# Patient Record
Sex: FEMALE | Race: WHITE | NOT HISPANIC OR LATINO | Employment: FULL TIME | URBAN - METROPOLITAN AREA
[De-identification: names, ages, dates, MRNs, and addresses within clinical notes are randomized per-mention and may not be internally consistent; named-entity substitution may affect disease eponyms.]

---

## 2024-01-08 ENCOUNTER — OFFICE VISIT (OUTPATIENT)
Dept: SURGICAL ONCOLOGY | Facility: HOSPITAL | Age: 69
End: 2024-01-08
Payer: COMMERCIAL

## 2024-01-08 ENCOUNTER — HOSPITAL ENCOUNTER (OUTPATIENT)
Dept: RADIOLOGY | Facility: HOSPITAL | Age: 69
Discharge: HOME | End: 2024-01-08
Payer: COMMERCIAL

## 2024-01-08 VITALS
SYSTOLIC BLOOD PRESSURE: 135 MMHG | DIASTOLIC BLOOD PRESSURE: 78 MMHG | TEMPERATURE: 96.8 F | WEIGHT: 147.2 LBS | BODY MASS INDEX: 24.5 KG/M2 | HEART RATE: 61 BPM

## 2024-01-08 DIAGNOSIS — R92.30 DENSE BREAST TISSUE: ICD-10-CM

## 2024-01-08 DIAGNOSIS — D05.02 LOBULAR CARCINOMA IN SITU (LCIS) OF LEFT BREAST: ICD-10-CM

## 2024-01-08 DIAGNOSIS — Z12.31 SCREENING MAMMOGRAM FOR HIGH-RISK PATIENT: ICD-10-CM

## 2024-01-08 DIAGNOSIS — D05.00 LOBULAR CARCINOMA IN SITU OF UNSPECIFIED BREAST: ICD-10-CM

## 2024-01-08 DIAGNOSIS — Z12.39 BREAST CANCER SCREENING, HIGH RISK PATIENT: Primary | ICD-10-CM

## 2024-01-08 PROCEDURE — 99213 OFFICE O/P EST LOW 20 MIN: CPT | Mod: 25 | Performed by: NURSE PRACTITIONER

## 2024-01-08 PROCEDURE — 77067 SCR MAMMO BI INCL CAD: CPT

## 2024-01-08 PROCEDURE — 77063 BREAST TOMOSYNTHESIS BI: CPT | Performed by: STUDENT IN AN ORGANIZED HEALTH CARE EDUCATION/TRAINING PROGRAM

## 2024-01-08 PROCEDURE — 77067 SCR MAMMO BI INCL CAD: CPT | Performed by: STUDENT IN AN ORGANIZED HEALTH CARE EDUCATION/TRAINING PROGRAM

## 2024-01-08 PROCEDURE — 1160F RVW MEDS BY RX/DR IN RCRD: CPT | Performed by: NURSE PRACTITIONER

## 2024-01-08 PROCEDURE — 1126F AMNT PAIN NOTED NONE PRSNT: CPT | Performed by: NURSE PRACTITIONER

## 2024-01-08 PROCEDURE — 99213 OFFICE O/P EST LOW 20 MIN: CPT | Performed by: NURSE PRACTITIONER

## 2024-01-08 PROCEDURE — 1159F MED LIST DOCD IN RCRD: CPT | Performed by: NURSE PRACTITIONER

## 2024-01-08 RX ORDER — FLUTICASONE FUROATE 27.5 UG/1
2 SPRAY, METERED NASAL
COMMUNITY

## 2024-01-08 RX ORDER — PHENTERMINE HYDROCHLORIDE 30 MG/1
30 CAPSULE ORAL
COMMUNITY

## 2024-01-08 ASSESSMENT — ENCOUNTER SYMPTOMS
LOSS OF SENSATION IN FEET: 0
DEPRESSION: 0
OCCASIONAL FEELINGS OF UNSTEADINESS: 0

## 2024-01-08 ASSESSMENT — PAIN SCALES - GENERAL: PAINLEVEL: 0-NO PAIN

## 2024-01-08 NOTE — PROGRESS NOTES
[facility] CANCER [place]  Lisa Covington female   1955 68 y.o.   [id number]      Chief Complaint  Follow up annual mammogram and exam.     History Of Present Illness  Lisa Covington is a very pleasant 68 y.o.  female followed in the breast center for high risk surveillance care. 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 She completed 5 years of Tamoxifen from 9587-3323. She has family history of breast cancer in her paternal grandmother. She presents today for her annual mammogram and exam.     BREAST IMAGIN2024 Bilateral screening mammogram, indicates BI-RADS Category 2.    REPRODUCTIVE HISTORY: menarche age 15, , first birth age 33, did not breastfeed, OCP's x 20 years, natural menopause age 55, no HRT, heterogeneously dense    FAMILY CANCER HISTORY:   Paternal Grandmother: Breast cancer, elderly when diagnosed     Review of Systems  Constitutional:  Negative for appetite change, fatigue, fever and unexpected weight change.   HENT:  Negative for ear pain, hearing loss, nosebleeds, sore throat and trouble swallowing.    Eyes:  Negative for discharge, itching and visual disturbance.   Breast: As stated in HPI.  Respiratory:  Negative for cough, chest tightness and shortness of breath.    Cardiovascular:  Negative for chest pain, palpitations and leg swelling.   Gastrointestinal:  Negative for abdominal pain, constipation, diarrhea and nausea.   Endocrine: Negative for cold intolerance and heat intolerance.   Genitourinary:  Negative for dysuria, frequency, hematuria, pelvic pain and vaginal bleeding.   Musculoskeletal:  Negative for arthralgias, back pain, gait problem, joint swelling and myalgias.   Skin:  Negative for color change and rash.   Allergic/Immunologic: Negative for environmental allergies and food allergies.   Neurological:  Negative for dizziness, tremors, speech difficulty, weakness, numbness  and headaches.   Hematological:  Does not bruise/bleed easily.   Psychiatric/Behavioral:  Negative for agitation, dysphoric mood and sleep disturbance. The patient is not nervous/anxious.      Past Medical History  She has a past medical history of Benign neoplasm of unspecified breast (07/14/2014), Breast cancer (CMS/[facility]), Encounter for gynecological examination (general) (routine) without abnormal findings (08/15/2014), Encounter for gynecological examination (general) (routine) without abnormal findings (01/27/2017), Encounter for gynecological examination (general) (routine) without abnormal findings (11/25/2015), Encounter for screening for osteoporosis (07/05/2016), Personal history of other diseases of the nervous system and sense organs (05/03/2014), Personal history of other specified conditions (09/30/2013), Personal history of urinary (tract) infections (04/28/2015), Postnasal drip (07/14/2014), Rash and other nonspecific skin eruption (07/05/2016), Unspecified benign mammary dysplasia of unspecified breast (12/12/2022), and Urinary tract infection, site not specified (01/05/2017).    Surgical History  She has a past surgical history that includes Dilation and curettage of uterus (08/15/2014); Tonsillectomy (08/15/2014); Breast biopsy (08/15/2014); and Sinus surgery (08/04/2015).    Family History  Cancer-related family history includes Breast cancer (age of onset: 60) in her paternal grandmother.     Social History  She reports that she has never smoked. She does not have any smokeless tobacco history on file. No history on file for alcohol use and drug use.    Allergies  Sulfa (sulfonamide antibiotics)    Medications  Current Outpatient Medications   Medication Instructions    fluticasone (Flonase Sensimist) 27.5 mcg/actuation nasal spray 2 sprays, Each Nostril, Daily RT    phentermine 30 mg, oral, Daily before breakfast       Last Recorded Vitals  Vitals:    01/08/24 0936   BP: 135/78   Pulse: 61    Temp: 36 °C (96.8 °F)        Physical Exam  Chest:       Patient is alert and oriented x3 and in a relaxed and appropriate mood. Her gait is steady and hand grasps are equal. Sclera is clear. The breasts are nearly symmetrical. Left breast has a well healed partial circum areolar incision. The tissue is soft without palpable abnormalities, discrete nodules or masses. The skin and nipples appear normal. There is no cervical, supraclavicular or axillary lymphadenopathy. Heart rate and rhythm normal, S1 and S2 appreciated. The lungs are clear to auscultation bilaterally. Abdomen is soft and non-tender.     Relevant Results and Imaging  BI mammo bilateral screening tomosynthesis 01/08/2024    Narrative  Interpreted By:  Emery Lorenzo,  STUDY:  BI MAMMO BILATERAL SCREENING TOMOSYNTHESIS;  1/8/2024 9:15 am    ACCESSION NUMBER(S):  [id number]    ORDERING CLINICIAN:  DONALD ELDER    INDICATION:  Screening. Patient has a family history of breast cancer in her  paternal grandmother at age 60. history of benign left excisional  biopsy demonstrating LCIS in 2007.    COMPARISON:  12/12/2022, 10/04/2021, 09/28/2020    FINDINGS:  2D and tomosynthesis images were reviewed at 1 mm slice thickness.    Density:  The breast tissue is heterogeneously dense, which may  obscure small masses.    Bilateral benign circumscribed masses. Biopsy marker in the upper  inner subareolar left breast. No suspicious masses or calcifications  are identified.    Impression  No mammographic evidence of malignancy.    BI-RADS CATEGORY:  BI-RADS Category:  2 Benign.  Recommendation:  Routine Screening Mammogram in 1 Year.  Recommended Date:  1 Year.  Laterality:  Bilateral.    For any future breast imaging appointments, please call [phone]  ([phone]).      MACRO:  None    Signed by: Emery Lorenzo 1/8/2024 9:23 AM  Dictation workstation:   [id number]    Time was spent viewing digital images. I explained the results in depth, along with suggested  explanation for follow up recommendations based on the testing results. BI-RADS Category 2    Orders  Orders Placed This Encounter   Procedures    BI mammo bilateral screening tomosynthesis     Standing Status:   Future     Standing Expiration Date:   3/8/2025     Order Specific Question:   Reason for exam:     Answer:   screening mammogram     Order Specific Question:   Radiologist to Determine Optimal Study     Answer:   Yes     Order Specific Question:   Release result to [employer]     Answer:   Immediate     Order Specific Question:   Is this exam part of a Research Study? If Yes, link this order to the research study     Answer:   No    MR breast bilateral w IV contrast fast screening self pay     Standing Status:   Future     Standing Expiration Date:   1/8/2025     Order Specific Question:   Reason for exam:     Answer:   high risk surveillance care, lifetime risk > 20%     Order Specific Question:   Does the patient have a Cochlear Implant, Pacemaker, Defibrillator, Pacing Wire, Brain Aneurysm Clip, Implanted Nerve or Bone Graft Simulator, Implanted Breast Tissue Expander, Glucose Monitor or Neulasta Device?     Answer:   No     Order Specific Question:   Radiologist to Determine Optimal Study     Answer:   Yes     Order Specific Question:   Release result to [employer]     Answer:   Immediate     Order Specific Question:   Is this exam part of a Research Study? If Yes, link this order to the research study     Answer:   No       Visit Diagnosis  1. Breast cancer screening, high risk patient  BI mammo bilateral screening tomosynthesis    Clinic Appointment Request Follow Up    MR breast bilateral w IV contrast fast screening self pay      2. Lobular carcinoma in situ (LCIS) of left breast        3. Dense breast tissue            Assessment/Plan  High risk surveillance care, normal clinical exam and imaging, history left breast excisional biopsy for LCIS, completed 5 years of Tamoxifen, no family history of breast  cancer, heterogeneously dense    Plan:  Return in January 2025 for bilateral screening mammogram and office visit. Proceed with fast breast MRI July 2024.     Patient Discussion/Summary  Your clinical examination and imaging are normal. Please return in one year for mammogram and office visit or sooner if you have any problems or concerns.     High risk breast surveillance care plan:  Yearly mammogram with digital breast tomosynthesis  Twice yearly clinical breast examinations  Breast MRI (to schedule call [phone])  Monthly self breast examinations &/or regular self breast awareness  Vitamin D3 2000 IU/daily (over the counter) unless your PCP recommends you take a specific dose  Exercise 3-4 times per week for 45-60 minutes  Limit alcohol to 3-4 drinks per week if you are menopausal  Eat healthy low-fat diet with lots of vegetable & fruits  Risk models indicate personal risk of breast cancer in the next 5 years and  lifetime (age 85-90):  Olga: 5-year risk 15.4% (average 1.6%), lifetime risk 37.2%, (average 4.6%)  You completed 5 years of Tamoxifen therapy decreasing your risk of breast cancer up to 50%.    You can see your health information, review clinical summaries from office visits & test results online when you follow your health with MY  Chart, a personal health record. To sign up go to [email or web address]. If you need assistance with signing up or trouble getting into your account call [employer] Patient Line 24/7 at [phone].    My office phone number is [phone] if you need to get in touch with me or have additional questions or concerns. Thank you for choosing [facility] and trusting me as your healthcare provider. I look forward to seeing you again at your next office visit. I am honored to be a provider on your health care team and I remain dedicated to helping you achieve your health goals.    Asha Renee, SONIA-CNP

## 2024-05-29 ENCOUNTER — HOSPITAL ENCOUNTER (OUTPATIENT)
Dept: RADIOLOGY | Facility: HOSPITAL | Age: 69
Discharge: HOME | End: 2024-05-29

## 2024-05-29 ENCOUNTER — TELEPHONE (OUTPATIENT)
Dept: SURGICAL ONCOLOGY | Facility: CLINIC | Age: 69
End: 2024-05-29
Payer: COMMERCIAL

## 2024-05-29 DIAGNOSIS — Z12.39 BREAST CANCER SCREENING, HIGH RISK PATIENT: ICD-10-CM

## 2024-05-29 PROCEDURE — 2550000001 HC RX 255 CONTRASTS: Performed by: NURSE PRACTITIONER

## 2024-05-29 PROCEDURE — A9575 INJ GADOTERATE MEGLUMI 0.1ML: HCPCS | Performed by: NURSE PRACTITIONER

## 2024-05-29 PROCEDURE — 6100000003 BI MR BREAST BILATERAL WITH CONTRAST FAST SCREENING SELF PAY

## 2024-05-29 RX ORDER — GADOTERATE MEGLUMINE 376.9 MG/ML
13 INJECTION INTRAVENOUS
Status: COMPLETED | OUTPATIENT
Start: 2024-05-29 | End: 2024-05-29

## 2024-05-29 RX ADMIN — GADOTERATE MEGLUMINE 13 ML: 376.9 INJECTION INTRAVENOUS at 07:14

## 2024-05-29 NOTE — TELEPHONE ENCOUNTER
Result Communication    Spoke Lisa Covington regarding normal MRI results.      Resulted Orders   MR breast bilateral w IV contrast fast screening self pay    Narrative    Interpreted By:  Andrey Hernandez,   STUDY:  BI MR BREAST BILATERAL WITH CONTRAST FAST SCREENING SELF PAY;  5/29/2024 7:19 am      ACCESSION NUMBER(S):  [id number]      ORDERING CLINICIAN:  DONALD ELDER      INDICATION:  High-risk screening. Family history of breast cancer prior, left  excisional biopsy for LCIS, dense breast tissue on mammography.      COMPARISON:  Breast MRI 06/05/2023, 05/16/2022; screening mammogram 01/08/2024.      TECHNIQUE:  Using a dedicated breast coil, STIR axial and T1-weighted fat  saturation axial images of the breasts were obtained, the latter both  before and after intravenous administration of Gadolinium DTPA.      Intravenous contrast: 13 mL of Dotarem      FINDINGS:  Density: Heterogeneous fibroglandular tissue.      There is symmetric marked bilateral background enhancement.      RIGHT BREAST: A few small scattered benign cysts. No suspicious mass  or nonmass enhancement is identified.      No axillary or internal mammary lymphadenopathy is appreciated.      LEFT BREAST: A few small scattered benign cysts. A biopsy tissue  marker is noted in subareolar region. No suspicious mass or nonmass  enhancement is identified.      No axillary or internal mammary lymphadenopathy is appreciated.      NON-BREAST FINDINGS:  None.        Impression    No MRI evidence of malignancy in either breast.  The patient is due  for annual screening mammography in January 2025.      BI-RADS CATEGORY:      BI-RADS Category:  2 Benign.  Recommendation:  Annual Screening.  Recommended Date:  1 Year.  Laterality:  Bilateral.      For any future breast imaging appointments, please call [phone] ([phone]).          MACRO:  None      Signed by: Andrey Hernandez 5/29/2024 11:37 AM  Dictation workstation:   [id number]       12:12 PM

## 2025-02-03 ENCOUNTER — APPOINTMENT (OUTPATIENT)
Dept: RADIOLOGY | Facility: HOSPITAL | Age: 70
End: 2025-02-03
Payer: COMMERCIAL

## 2025-02-03 ENCOUNTER — HOSPITAL ENCOUNTER (OUTPATIENT)
Dept: RADIOLOGY | Facility: HOSPITAL | Age: 70
Discharge: HOME | End: 2025-02-03
Payer: COMMERCIAL

## 2025-02-03 ENCOUNTER — OFFICE VISIT (OUTPATIENT)
Dept: SURGICAL ONCOLOGY | Facility: HOSPITAL | Age: 70
End: 2025-02-03
Payer: COMMERCIAL

## 2025-02-03 ENCOUNTER — APPOINTMENT (OUTPATIENT)
Dept: SURGICAL ONCOLOGY | Facility: HOSPITAL | Age: 70
End: 2025-02-03
Payer: COMMERCIAL

## 2025-02-03 VITALS
HEART RATE: 71 BPM | BODY MASS INDEX: 24.99 KG/M2 | SYSTOLIC BLOOD PRESSURE: 122 MMHG | DIASTOLIC BLOOD PRESSURE: 60 MMHG | HEIGHT: 65 IN | RESPIRATION RATE: 16 BRPM | WEIGHT: 150 LBS

## 2025-02-03 DIAGNOSIS — D05.02 LOBULAR CARCINOMA IN SITU (LCIS) OF LEFT BREAST: ICD-10-CM

## 2025-02-03 DIAGNOSIS — R92.30 DENSE BREAST TISSUE: ICD-10-CM

## 2025-02-03 DIAGNOSIS — Z12.31 BREAST CANCER SCREENING BY MAMMOGRAM: ICD-10-CM

## 2025-02-03 DIAGNOSIS — Z12.39 BREAST CANCER SCREENING, HIGH RISK PATIENT: Primary | ICD-10-CM

## 2025-02-03 DIAGNOSIS — Z12.39 BREAST CANCER SCREENING, HIGH RISK PATIENT: ICD-10-CM

## 2025-02-03 PROCEDURE — 1159F MED LIST DOCD IN RCRD: CPT | Performed by: NURSE PRACTITIONER

## 2025-02-03 PROCEDURE — 3008F BODY MASS INDEX DOCD: CPT | Performed by: NURSE PRACTITIONER

## 2025-02-03 PROCEDURE — 99214 OFFICE O/P EST MOD 30 MIN: CPT | Performed by: NURSE PRACTITIONER

## 2025-02-03 PROCEDURE — 77063 BREAST TOMOSYNTHESIS BI: CPT

## 2025-02-03 PROCEDURE — 1036F TOBACCO NON-USER: CPT | Performed by: NURSE PRACTITIONER

## 2025-02-03 PROCEDURE — 77063 BREAST TOMOSYNTHESIS BI: CPT | Performed by: RADIOLOGY

## 2025-02-03 PROCEDURE — 77067 SCR MAMMO BI INCL CAD: CPT | Performed by: RADIOLOGY

## 2025-02-03 RX ORDER — MELOXICAM 15 MG/1
15 TABLET ORAL DAILY
COMMUNITY

## 2025-02-03 NOTE — PROGRESS NOTES
[facility]  Lisa Covington female   1955 69 y.o.   [id number]      Chief Complaint  Follow up annual mammogram and exam.     History Of Present Illness  Lisa Covington is a very pleasant 69 y.o.  female followed in the breast center for high risk surveillance care. 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 She completed 5 years of Tamoxifen from 9620-4811. She has family history of breast cancer in her paternal grandmother. She presents today for her annual mammogram and exam.     BREAST IMAGIN2024 Bilateral screening mammogram, indicates BI-RADS Category 2.    REPRODUCTIVE HISTORY: menarche age 15, , first birth age 33, did not breastfeed, OCP's x 20 years, natural menopause age 55, no HRT, heterogeneously dense    FAMILY CANCER HISTORY:   Paternal Grandmother: Breast cancer, elderly when diagnosed     Review of Systems  Constitutional:  Negative for appetite change, fatigue, fever and unexpected weight change.   HENT:  Negative for ear pain, hearing loss, nosebleeds, sore throat and trouble swallowing.    Eyes:  Negative for discharge, itching and visual disturbance.   Breast: As stated in HPI.  Respiratory:  Negative for cough, chest tightness and shortness of breath.    Cardiovascular:  Negative for chest pain, palpitations and leg swelling.   Gastrointestinal:  Negative for abdominal pain, constipation, diarrhea and nausea.   Endocrine: Negative for cold intolerance and heat intolerance.   Genitourinary:  Negative for dysuria, frequency, hematuria, pelvic pain and vaginal bleeding.   Musculoskeletal:  Negative for arthralgias, back pain, gait problem, joint swelling and myalgias.   Skin:  Negative for color change and rash.   Allergic/Immunologic: Negative for environmental allergies and food allergies.   Neurological:  Negative for dizziness, tremors, speech difficulty, weakness, numbness  and headaches.   Hematological:  Does not bruise/bleed easily.   Psychiatric/Behavioral:  Negative for agitation, dysphoric mood and sleep disturbance. The patient is not nervous/anxious.           Past Medical History  She has a past medical history of Benign neoplasm of unspecified breast (07/14/2014), Breast cancer (Multi), Encounter for gynecological examination (general) (routine) without abnormal findings (08/15/2014), Encounter for gynecological examination (general) (routine) without abnormal findings (01/27/2017), Encounter for gynecological examination (general) (routine) without abnormal findings (11/25/2015), Encounter for screening for osteoporosis (07/05/2016), Fibrocystic breast (years), Lobular carcinoma in situ (2007), Personal history of other diseases of the nervous system and sense organs (05/03/2014), Personal history of other specified conditions (09/30/2013), Personal history of urinary (tract) infections (04/28/2015), Postnasal drip (07/14/2014), Rash and other nonspecific skin eruption (07/05/2016), Unspecified benign mammary dysplasia of unspecified breast (12/12/2022), and Urinary tract infection, site not specified (01/05/2017).    She has no past medical history of Personal history of irradiation.    Surgical History  She has a past surgical history that includes Dilation and curettage of uterus (08/15/2014); Tonsillectomy (08/15/2014); Breast biopsy (08/15/2014); and Sinus surgery (08/04/2015).    Family History  Cancer-related family history includes Breast cancer (age of onset: 60) in her paternal grandmother.     Social History  She reports that she has never smoked. She has never used smokeless tobacco. No history on file for alcohol use and drug use.    Allergies  Sulfa (sulfonamide antibiotics)    Medications  Current Outpatient Medications   Medication Instructions    fluticasone (Flonase Sensimist) 27.5 mcg/actuation nasal spray 2 sprays, Each Nostril, Daily RT    meloxicam  (MOBIC) 15 mg, oral, Daily    phentermine 30 mg, oral, Daily before breakfast       Last Recorded Vitals  Vitals:    02/03/25 0811   BP: 122/60   Pulse: 71   Resp: 16        Physical Exam  Chest:         Patient is alert and oriented x3 and in a relaxed and appropriate mood. Her gait is steady and hand grasps are equal. Sclera is clear. The breasts are nearly symmetrical. Left breast has a well healed partial circum areolar incision. The tissue is soft without palpable abnormalities, discrete nodules or masses. The skin and nipples appear normal. There is no cervical, supraclavicular or axillary lymphadenopathy.     Relevant Results and Imaging  Study Result    Narrative & Impression   Interpreted By:  Naz Ortega,   STUDY:  BI MAMMO BILATERAL SCREENING TOMOSYNTHESIS;  2/3/2025 8:57 am      ACCESSION NUMBER(S):  [id number]      ORDERING CLINICIAN:  DONALD ELDER      INDICATION:  Screening. Benign excisional left breast biopsy. Benign stereotactic  left breast biopsy. Family history of breast cancer in paternal  grandmother      ,Z12.39 Encounter for other screening for malignant neoplasm of breast      COMPARISON:  01/08/2024, 12/12/2022 10/04/2021, 09/20/2020. MRI 05/29/2024      FINDINGS:  2D and tomosynthesis images were reviewed at 1 mm slice thickness.      Density:  The breasts are heterogeneously dense, which may obscure  small masses.      Stable postsurgical scarring in the anterior upper outer left breast.  Tissue marker in the central superior left breast. Bilateral waxing  and waning breast masses consistent with cyst pattern. No suspicious  masses or calcifications are identified.      IMPRESSION:  No mammographic evidence of malignancy.      BI-RADS CATEGORY:  BI-RADS Category:  2 Benign.  Recommendation:  Annual Screening.  Recommended Date:  1 Year.  Laterality:  Bilateral.              For any future breast imaging appointments, please call [phone] ([phone]).          MACRO:  None      Signed  by: Naz Ortega 2/3/2025 9:43 AM  Dictation workstation:   [id number]     Time was spent viewing digital images. I explained the results in depth, along with suggested explanation for follow up recommendations based on the testing results. BI-RADS Category 2      Risk Models        Orders  Orders Placed This Encounter   Procedures    BI mammo bilateral screening tomosynthesis     Standing Status:   Future     Standing Expiration Date:   3/3/2026     Order Specific Question:   Reason for exam:     Answer:   annual screening mammogram     Order Specific Question:   Radiologist to Determine Optimal Study     Answer:   Yes     Order Specific Question:   Release result to [employer]     Answer:   Immediate     Order Specific Question:   Is this exam part of a Research Study? If Yes, link this order to the research study     Answer:   No    MR breast bilateral w IV contrast fast screening self pay     Standing Status:   Future     Standing Expiration Date:   2/3/2026     Order Specific Question:   Reason for exam:     Answer:   high risk surveillance care, dense breast tissue, lifetime risk > 20%     Order Specific Question:   Is the patient pregnant or breast feeding?     Answer:   No     Order Specific Question:   Does the patient have a Cochlear Implant, Brain Aneurysm Clip, Implanted Nerve or Bone Graft Stimulator, Implanted Breast Tissue Expander, Glucose Monitor or Neulasta Device?     Answer:   No     Order Specific Question:   Radiologist to Determine Optimal Study     Answer:   Yes     Order Specific Question:   Release result to [employer]     Answer:   Immediate     Order Specific Question:   Is this exam part of a Research Study? If Yes, link this order to the research study     Answer:   No       Visit Diagnosis  1. Breast cancer screening, high risk patient  Clinic Appointment Request Follow Up    MR breast bilateral w IV contrast fast screening self pay      2. Breast cancer screening by mammogram  Clinic  Appointment Request Follow Up    BI mammo bilateral screening tomosynthesis      3. Dense breast tissue        4. Lobular carcinoma in situ (LCIS) of left breast            Assessment/Plan  High risk surveillance care, normal clinical exam and imaging, history left breast excisional biopsy for LCIS, completed 5 years of Tamoxifen, heterogeneously dense    Plan:  Return in February 2026 for bilateral screening mammogram and office visit. Proceed with fast breast MRI August 2025..     Patient Discussion/Summary  Your clinical examination and imaging are normal. Please return in one year for mammogram and office visit or sooner if you have any problems or concerns.     High risk breast surveillance care plan:  Yearly mammogram with digital breast tomosynthesis  Twice yearly clinical breast examinations  Breast MRI (to schedule call [phone])  Monthly self breast examinations &/or regular self breast awareness  Vitamin D3 2000 IU/daily (over the counter) unless your PCP recommends you take a specific dose  Exercise 3-4 times per week for 45-60 minutes  Limit alcohol to 3-4 drinks per week if you are menopausal  Eat healthy low-fat diet with lots of vegetable & fruits  You completed 5 years of Tamoxifen and endocrine therapy reduces lifetime risk of breast cancer by up to 50% when taken for 5 years.     You can see your health information, review clinical summaries from office visits & test results online when you follow your health with MY  Chart, a personal health record. To sign up go to [email or web address]. If you need assistance with signing up or trouble getting into your account call [employer] Patient Line 24/7 at [phone].    My office phone number is [phone] if you need to get in touch with me or have additional questions or concerns. Thank you for choosing [facility] and trusting me as your healthcare provider. I look forward to seeing you again at your next office visit. I am  honored to be a provider on your health care team and I remain dedicated to helping you achieve your health goals.    Asha Renee, APRN-CNP

## 2025-03-03 ENCOUNTER — APPOINTMENT (OUTPATIENT)
Dept: RADIOLOGY | Facility: HOSPITAL | Age: 70
End: 2025-03-03
Payer: COMMERCIAL